# Patient Record
Sex: FEMALE | Race: ASIAN
[De-identification: names, ages, dates, MRNs, and addresses within clinical notes are randomized per-mention and may not be internally consistent; named-entity substitution may affect disease eponyms.]

---

## 2018-03-03 ENCOUNTER — HOSPITAL ENCOUNTER (EMERGENCY)
Dept: HOSPITAL 80 - CED | Age: 43
Discharge: HOME | End: 2018-03-03
Payer: COMMERCIAL

## 2018-03-03 VITALS — TEMPERATURE: 98.2 F | RESPIRATION RATE: 16 BRPM

## 2018-03-03 VITALS — HEART RATE: 65 BPM | SYSTOLIC BLOOD PRESSURE: 142 MMHG | OXYGEN SATURATION: 97 % | DIASTOLIC BLOOD PRESSURE: 90 MMHG

## 2018-03-03 DIAGNOSIS — E86.9: ICD-10-CM

## 2018-03-03 DIAGNOSIS — R55: Primary | ICD-10-CM

## 2018-03-03 LAB — PLATELET # BLD: 289 10^3/UL (ref 150–400)

## 2018-03-03 NOTE — CPEKG
Heart Rate: 57

RR Interval: 1053

P-R Interval: 172

QRSD Interval: 76

QT Interval: 424

QTC Interval: 413

P Axis: 73

QRS Axis: 44

T Wave Axis: 35

EKG Severity - NORMAL ECG -

EKG Impression: SINUS RHYTHM

Electronically Signed By: Gian Pearson 03-Mar-2018 15:46:00

## 2018-03-03 NOTE — EDPHY
H & P


Stated Complaint: passed out, 0930


Time Seen by Provider: 03/03/18 14:46


HPI/ROS: 





CHIEF COMPLAINT:  Syncope





HISTORY OF PRESENT ILLNESS:  Patient is a 42-year-old female who was taking a 

hot shower this morning around 9:30 a.m. When she felt lightheaded and fainted.

  Her boyfriend caught her and carried her to her bed.  She did not go to the 

ground.  She woke up soon thereafter.  She did not have any seizure activity or 

postictal phase.  She did not vomit.  She denies chest pain or palpitations.  

She denies recent fevers or infections.  No headache.  She states that she 

fainted once several years ago when it was very hot outside.  She did eat a 

light breakfast this morning and had been drinking normally.  She denies 

pregnancy.  She has not been bleeding it is not think she is anemic.








REVIEW OF SYSTEMS:


Constitutional:  denies: chills, fever, recent illness, recent injury


EENTM: denies: blurred vision, double vision, nose congestion


Respiratory: denies: cough, shortness of breath


Cardiac: denies: chest pain, irregular heart rate, lightheadedness, palpitations


Gastrointestinal/Abdominal: denies: abdominal pain, diarrhea, nausea, vomiting, 

blood streaked stools


Genitourinary: denies: dysuria, frequency, hematuria, pain


Musculoskeletal: denies: joint pain, muscle pain


Skin: denies: lesions, rash, jaundice, bruising


Neurological: denies: headache, numbness, paresthesia, tingling, dizziness, 

weakness


Hematologic/Lymphatic: denies: blood clots, easy bleeding, easy bruising


Immunologic/allergic: denies: HIV/AIDS, transplant








EXAM:


GENERAL:  Well-appearing, well-nourished and in no acute distress.


HEAD:  Atraumatic, normocephalic.


EYES:  Pupils equal round and reactive to light, extraocular movements intact, 

sclera anicteric, conjunctiva are normal.


ENT:  TMs normal, nares patent, oropharynx clear without exudates.  Moist 

mucous membranes.


NECK:  Normal range of motion, supple without lymphadenopathy or JVD.


LUNGS:  Breath sounds clear to auscultation bilaterally and equal.  No wheezes 

rales or rhonchi.


HEART:  Regular rate and rhythm without murmurs, rubs or gallops.


ABDOMEN:  Soft, nontender, normoactive bowel sounds.  No guarding, no rebound.  

No masses appreciated.


BACK:  No CVA tenderness, no spinal tenderness, step-offs or deformities


EXTREMITIES:  Normal range of motion, no pitting or edema.  No clubbing or 

cyanosis.


NEUROLOGICAL:  Cranial nerves II through XII grossly intact.  Normal speech, 

normal gait.  5/5 strength, normal movement in all extremities, normal sensation


PSYCH:  Normal mood, normal affect.


SKIN:  Warm, dry, normal turgor, no visible rashes or lesions.














Source: Patient, Family


Exam Limitations: No limitations





- Personal History


LMP (Females 10-55): Now


Current Tetanus Diphtheria and Acellular Pertussis (TDAP): Yes





- Medical/Surgical History


Hx Asthma: No


Hx Chronic Respiratory Disease: No


Hx Diabetes: No


Hx Cardiac Disease: No


Hx Renal Disease: No


Hx Cirrhosis: No


Hx Alcoholism: No


Hx HIV/AIDS: No


Hx Splenectomy or Spleen Trauma: No


Other PMH: infectious collits,





- Family History


Significant Family History: No pertinent family hx





- Social History


Smoking Status: Never smoked


Alcohol Use: Sober


Drug Use: None


Constitutional: 


 Initial Vital Signs











Temperature (C)  36.8 C   03/03/18 14:30


 


Heart Rate  61   03/03/18 14:30


 


Respiratory Rate  16   03/03/18 14:30


 


Blood Pressure  143/95 H  03/03/18 14:30


 


O2 Sat (%)  99   03/03/18 14:30








 











O2 Delivery Mode               Room Air














Allergies/Adverse Reactions: 


 





ondansetron [From Zofran (as hydrochloride)] Allergy (Verified 03/03/18 14:39)


 











Medical Decision Making





- Diagnostics


EKG Interpretation: 





An EKG obtained and was read and documented in trace view.  Please see trace 

view for full reading and report.  Sinus rhythm, no acute ischemic changes 


ED Course/Re-evaluation: 





3:50 p.m. the patient feels completely well.  We discussed her lab results 

which are reassuring as is her EKG and vital signs.  Patient states that she 

has a history of nephrotic syndrome and has mild baseline renal insufficiency.  

She has been drinking fluids.  She is eager to go home.  We discussed follow-up 

with her primary Cardiology if she continues to have episodes or palpitations.


Differential Diagnosis: 





Partial list of the Differential diagnosis considered include but were not 

limited to;  syncope, presyncope and although unlikely based on the history and 

physical exam, I also considered arrhythmia, dehydration, electrolyte 

abnormality.  I discussed these differential diagnoses and the plan with the 

patient as well as the usual and expected course.  The patient understands that 

the diagnosis is provisional and that in medicine we are not always correct and 

that further workup is often warranted.  Usual and customary warnings were 

given.  All of the patient's questions were answered.  The patient was 

instructed to return to the emergency department should the symptoms at all 

worsen or return, otherwise to followup with the physician as we discussed.





- Data Points


Laboratory Results: 


 Laboratory Results





 03/03/18 15:00 





 03/03/18 15:00 





 











  03/03/18 03/03/18 03/03/18





  15:00 15:00 15:00


 


WBC      6.70 10^3/uL 10^3/uL





     (3.80-9.50) 


 


RBC      4.96 10^6/uL 10^6/uL





     (4.18-5.33) 


 


Hgb      15.1 g/dL g/dL





     (12.6-16.3) 


 


Hct      44.9 % %





     (38.0-47.0) 


 


MCV      90.5 fL fL





     (81.5-99.8) 


 


MCH      30.4 pg pg





     (27.9-34.1) 


 


MCHC      33.6 g/dL g/dL





     (32.4-36.7) 


 


RDW      12.7 % %





     (11.5-15.2) 


 


Plt Count      289 10^3/uL 10^3/uL





     (150-400) 


 


MPV      8.8 fL fL





     (8.7-11.7) 


 


Neut % (Auto)      61.4 % %





     (39.3-74.2) 


 


Lymph % (Auto)      29.3 % %





     (15.0-45.0) 


 


Mono % (Auto)      7.9 % %





     (4.5-13.0) 


 


Eos % (Auto)      0.6 % %





     (0.6-7.6) 


 


Baso % (Auto)      0.7 % %





     (0.3-1.7) 


 


Nucleat RBC Rel Count      0.0 % %





     (0.0-0.2) 


 


Absolute Neuts (auto)      4.11 10^3/uL 10^3/uL





     (1.70-6.50) 


 


Absolute Lymphs (auto)      1.96 10^3/uL 10^3/uL





     (1.00-3.00) 


 


Absolute Monos (auto)      0.53 10^3/uL 10^3/uL





     (0.30-0.80) 


 


Absolute Eos (auto)      0.04 10^3/uL 10^3/uL





     (0.03-0.40) 


 


Absolute Basos (auto)      0.05 10^3/uL 10^3/uL





     (0.02-0.10) 


 


Absolute Nucleated RBC      0.00 10^3/uL 10^3/uL





     (0-0.01) 


 


Immature Gran %      0.1 % %





     (0.0-1.1) 


 


Immature Gran #      0.01 10^3/uL 10^3/uL





     (0.00-0.10) 


 


Sodium    138 mEq/L mEq/L  





    (135-145)  


 


Potassium    4.5 mEq/L mEq/L  





    (3.5-5.2)  


 


Chloride    103 mEq/L mEq/L  





    ()  


 


Carbon Dioxide    26 mEq/l mEq/l  





    (22-31)  


 


Anion Gap    9 mEq/L mEq/L  





    (8-16)  


 


BUN    13 mg/dL mg/dL  





    (7-23)  


 


Creatinine    1.4 mg/dL H mg/dL  





    (0.6-1.0)  


 


Estimated GFR    41   





    


 


Glucose    104 mg/dL H mg/dL  





    ()  


 


Calcium    9.3 mg/dL mg/dL  





    (8.5-10.4)  


 


Beta HCG, Qual  NEGATIVE     





    











Medications Given: 


 








Discontinued Medications





Sodium Chloride (Ns)  1,000 mls @ 0 mls/hr IV EDNOW ONE; Wide Open


   PRN Reason: Protocol


   Stop: 03/03/18 14:53


   Last Admin: 03/03/18 15:12 Dose:  1,000 mls








Departure





- Departure


Disposition: [facility] Inpatient Acute


Clinical Impression: 


Syncope


Qualifiers:


 Syncope type: unspecified Qualified Code(s): R55 - Syncope and collapse





Condition: Good


Instructions:  Syncope (ED)


Referrals: 


NONE *PRIMARY CARE P,. [Primary Care Provider] - As per Instructions


Mayte Antonio MD [Medical Doctor] - As per Instructions